# Patient Record
Sex: MALE
[De-identification: names, ages, dates, MRNs, and addresses within clinical notes are randomized per-mention and may not be internally consistent; named-entity substitution may affect disease eponyms.]

---

## 2022-05-18 ENCOUNTER — HOSPITAL ENCOUNTER (EMERGENCY)
Dept: HOSPITAL 97 - ER | Age: 47
LOS: 1 days | Discharge: HOME | End: 2022-05-19
Payer: SELF-PAY
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
## 2022-05-18 DIAGNOSIS — S42.302A: Primary | ICD-10-CM

## 2022-05-18 DIAGNOSIS — E78.00: ICD-10-CM

## 2022-05-18 DIAGNOSIS — I10: ICD-10-CM

## 2022-05-18 PROCEDURE — 99284 EMERGENCY DEPT VISIT MOD MDM: CPT

## 2022-05-18 PROCEDURE — 96372 THER/PROPH/DIAG INJ SC/IM: CPT

## 2022-05-18 NOTE — XMS REPORT
Continuity of Care Document

                             Created on:May 18, 2022



Patient:AURELIA ALVARADO

Sex:Male

:1975

External Reference #:[id number]





Demographics







                          Address                   [address] [place] [address]

 

                          Home Phone                [phone]

 

                          Work Phone                [phone]

 

                          Email Address             [email or web address]

 

                          Preferred Language        English

 

                          Marital Status            Unknown

 

                          Restorationism Affiliation     Unknown

 

                          Race                      Unknown

 

                          Ethnic Group              Unknown









Author







                          Organization              [facility]

t

 

                          Address                   [address]



                                                    [place] [address]

 

                          Phone                     [phone]









Support







                Name            Relationship    Address         Phone

 

                VINNIE HINSON Unavailable     [address] [facility] [phone]



                                                         



                                                [place] [address] 

 

                VINNIE HINSON Unavailable     [phone]

[address]



                                                         



                                                [place] [address] 

 

                NONE, OTHER     Unavailable     [address] [facility] [phone]



                                                         



                                                [place] [address] 









Care Team Providers







                    Name                Role                Phone

 

                    Unavailable         Unavailable         Unavailable









Payers







           Payer Name Policy Type Policy Number Effective Date Expiration Date S

ource







Problems

This patient has no known problems.



Allergies, Adverse Reactions, Alerts







       Allergy Allergy Status Severity Reaction(s) Onset  Inactive Treating Comm

ents 

Source



       Name   Type                        Date   Date   Clinician        

 

       [place] DA     Active U             -0                      [facility]



       ins                                3-15                        Clear



                                          00:00:                      [place]



                                          00                          [facility]

 

       [place] DA     Active U             -0                      [facility]



       ins                                3-13                        Clear



                                          00:00:                      [place]



                                          00                          [facility]

 

       [place] DA     Active U             -0                      [facility]



       ins                                4-16                        Clear



                                          00:00:                      [place]



                                          [address] [place] [address]







Medications

This patient has no known medications.



Procedures

This patient has no known procedures.



Results







           Test Description Test Time  Test Comments Results    Result     [facility]

e



                                                       Comments   

 

           SURGICAL SPECIMENS 2019            ---------------------       

     



                      07:52:00              ---------------------            



                                            ---------------------            



                                            ---------------------            



                                            --------RUN DATE:            



                                            19              



                                                   [facility]            



                                            LAB  *LIVE*            



                                                      PAGE 1            



                                            RUN TIME: 7846            



                                                                  



                                            Specimen Inquiry            



                                                          RUN            



                                            USER: INTERFACE            



                                                                  



                                                                  



                                                                  



                                            ---------------------            



                                            ---------------------            



                                            ---------------------            



                                            ---------------------            



                                            --------PATIENT:            



                                            AURELIA ALVARADO            



                                                    ACCT #:            



                                            [id number] [facility]            



                                            [facility] #:            



                                            [id number]            



                                                                  



                                                 AGE/SX: 43/M            



                                                ROOM: [facility]            



                                            RE19REG DR:            



                                            Bimal Londono MD            



                                                    :            



                                            73     BED:  1            



                                                    DIS: 19            



                                                                  



                                                                  



                                            STATUS: DIS Haylie            



                                            TLOC:                 



                                            ---------------------            



                                            ---------------------            



                                            ---------------------            



                                            ---------------------            



                                            -------- SPEC #:            



                                            19:CL:            



                                            RECD:             



                                              STATUS:  RAVEN RODRIGUEZ #: [id number]            



                                                                  



                                              VISHAL:             



                                                SUBM DR:            



                                            Bimal Londono MD            



                                                     ENTERED:            



                                                SP            



                                            TYPE: SURG SPEC            



                                            OTHR DR: Self            



                                            Referred              



                                                                  



                                                                  



                                                                  



                                            Jonathan Bailey MD, Svetang V MD Du, Khoi H MD Shah, Dhruvil R MDORDERED:  GM LEVEL            



                                            4                     



                                                                  



                                                                  



                                                    CODES: [id number]            



                                            - GALLBLADDER FOS            



                                            COPIES TO:   Self            



                                            Referred              



                                            Jonathan Bailey MD   [address]            



                                            [facility] [address]            



                                             [place] [address] [phone]            



                                            [email or web address]            



                                            [employer]            



                                            Annemarie Rebolledo MD            



                                            [address]            



                                            [place] [address] [phone]            



                                            Mark Meyer MD   [address] [place] [address] Blvd            



                                            [address]   [place]            



                                            [address] [phone]            



                                              Bimal Londono MD   [address]            



                                            [place]            



                                            [address]   [phone]            



                                              Parmjit Rdz MD            



                                             [address] [place] [address] [phone]            



                                            PROCEDURES: GM LEVEL            



                                            4 (Incomplete)            



                                            TISSUES:           1.            



                                            GALLBLADDER FOSSA OF            



                                            LIVER - Gallbladder            



                                                                  



                                                     ** CONTINUED            



                                            ON NEXT PAGE **            



                                            ---------------------            



                                            ---------------------            



                                            ---------------------            



                                            ---------------------            



                                            --------RUN DATE:            



                                            19              



                                                   [facility]            



                                            LAB  *LIVE*            



                                                      PAGE 2            



                                            RUN TIME: 753            



                                                                  



                                            Specimen Inquiry            



                                                          RUN            



                                            USER: INTERFACE            



                                                                  



                                                                  



                                                                  



                                            ---------------------            



                                            ---------------------            



                                            ---------------------            



                                            ---------------------            



                                            --------SPEC #:            



                                            19:CL:            



                                            PATIENT:              



                                            AURELIA ALVARADO            



                                                                  



                                            #[id number]            



                                            (Continued)----------            



                                            ---------------------            



                                            ---------------------            



                                            ---------------------            



                                            -------------------            



                                                   FINAL            



                                            DIAGNOSIS             



                                            Gallbladder,            



                                            cholecystectomy:            



                                            Acute and chronic            



                                            cholecystitis with            



                                            cholelithiasis.            



                                            GROSS AND MICROSCOPIC            



                                               GROSS DESCRIPTION:            



                                             Received in formalin            



                                            and labeled            



                                            "Gallbladder" is a 9            



                                            x   3 cm gallbladder.            



                                            The serosa is            



                                            erythematous with            



                                            focal exudate.  The            



                                            wall    thickness is            



                                            0.5 cm The mucosa is            



                                            erythematous with            



                                            focal necrosis, but            



                                            is    without            



                                            abnormal masses. The            



                                            lumen contains            



                                            multiple stones up to            



                                            1.3 cm in   diameter.            



                                             Representative            



                                            sections.             



                                                                  



                                                                  



                                                                  



                                                                  



                                                                  



                                            MICROSCOPIC            



                                            EXAMINATION:  The            



                                            gallbladder mucosa is            



                                            ulcerated with            



                                            mucosal    necrosis            



                                            and associated acute            



                                            inflammation, which            



                                            extends into the wall            



                                            and   onto the            



                                            serosa.               



                                                                  



                                                                  



                                                    POST-OP            



                                            DIAGNOSIS    Acute            



                                            cholecystitis            



                                             PRE-OP DIAGNOSIS            



                                            Acute cholecystitis            



                                                     REVIEWED BY:            



                                                                  



                                            CARLOS EDUARDO-------------------            



                                            ---------------------            



                                            ---------------------            



                                            ---------------------            



                                            ---------- Signed            



                                            SIGNATURE ON FILE            



                                                                  



                                            Vijay Carlson MD            



                                            19 0752            



                                            ---------------------            



                                            ---------------------            



                                            ---------------------            



                                            ---------------------            



                                            --------              



                                                                  



                                                            **            



                                            END OF REPORT **            









                    COMPREHENSIVE METABOLIC PANEL 2019 08:21:00 









                      Test Item  Value      Reference Range Interpretation Comme

nts









             SODIUM (test code = NA) 140 mEq/L    134-147      N            

 

             POTASSIUM (test code = K) 3.9 mEq/L    3.4-5.0      N            

 

             CHLORIDE (test code = CL) 108 mEq/L    100-108      N            

 

             CARBON DIOXIDE (test code = CO2) 27 mEq/L     21-33        N       

     

 

             ANION GAP (test code = GAP) 9            0-20         N            

 

             GLUCOSE (test code = GLU) 112 mg/dL           H            

 

             BLOOD UREA NITROGEN (test code = 12 mg/dL     7-18                 

     



             BUN)                                                

 

             GLOMERULAR FILTRATION RATE (test 105.0               N       

     Units of measure =



             code = GFR)                                         ml/min/1.73 m2

 

             CREATININE (test code = CREAT) 0.8 mg/dL    0.6-1.3      N         

   

 

             TOTAL PROTEIN (test code = PROT) 7.0 g/dL     6.4-8.2      N       

     

 

             ALBUMIN (test code = ALB) 3.30 g/dL    3.4-5.0      L            

 

             CALCIUM (test code = CA) 8.9 mg/dL    8.0-10.5     N            

 

             BILIRUBIN TOTAL (test code = 0.50 mg/dL   0.0-1.0                  

 



             BILT)                                               

 

             SGOT/AST (test code = AST) 132 IUnit/L  15-37        H            

 

             SGPT/ALT (test code = ALT) 302 IUnit/L  15-65        H            

 

             ALKALINE PHOSPHATASE TOTAL (test 111 IUnit/L         N       

     



             code = ALKP)                                        



CBC W/AUTO [id number] 06:57:00





             Test Item    Value        Reference Range Interpretation Comments

 

             WHITE BLOOD CELL (test code = 5.57 x10 3/uL 4.5-11.0     N         

   



             WBC)                                                

 

             RED BLOOD CELL (test code = 4.66 x10 6/uL 4.00-5.60    N           

 



             RBC)                                                

 

             HEMOGLOBIN (test code = HGB) 13.8 g/dL    12.5-16.9    N           

 

 

             HEMATOCRIT (test code = HCT) 42.5 %       37.5-50.7    N           

 

 

             MEAN CELL VOLUME (test code = 91.2 fL      81.0-99.0    N          

  



             MCV)                                                

 

             MEAN CELL HGB (test code = MCH) 29.6 pg      27.0-33.0    N        

    

 

             MEAN CELL HGB CONCETRATION 32.5 g/dL    33.0-37.0    L            



             (test code = MCHC)                                        

 

             RED CELL DISTRIBUTION WIDTH CV 11.9 %       11.5-14.5    N         

   



             (test code = RDW)                                        

 

             RED CELL DISTRIBUTION WIDTH SD 39.7 fL      37.0-54.0    N         

   



             (test code = RDW-SD)                                        

 

             PLATELET COUNT (test code = 272 x10 3/uL 150-400      N            



             PLT)                                                

 

             MEAN PLATELET VOLUME (test code 9.7 fL       7.0-9.0      H        

    



             = MPV)                                              

 

             NEUTROPHIL % (test code = NT%) 63.3 %       56.0-77.0    N         

   

 

             IMMATURE GRANULOCYTE % (test 0.4 %        0.0-2.0      N           

 



             code = IG%)                                         

 

             LYMPHOCYTE % (test code = LY%) 22.6 %       14.0-32.0    N         

   

 

             MONOCYTE % (test code = MO%) 11.3 %       4.8-9.0      H           

 

 

             EOSINOPHIL % (test code = EO%) 2.2 %        0.3-3.7      N         

   

 

             BASOPHIL % (test code = BA%) 0.2 %        0.0-2.0      N           

 

 

             NUCLEATED RBC % (test code = 0.0 %        0-0          N           

 



             NRBC%)                                              

 

             NEUTROPHIL # (test code = NT#) 3.53 x10 3/uL 2.0-7.6      N        

    

 

             IMMATURE GRANULOCYTE # (test 0.02 x10 3/uL 0.00-0.03    N          

  



             code = IG#)                                         

 

             LYMPHOCYTE # (test code = LY#) 1.26 x10 3/uL 1.0-3.8      N        

    

 

             MONOCYTE # (test code = MO#) 0.63 x10 3/uL 0.1-0.8      N          

  

 

             EOSINOPHIL # (test code = EO#) 0.12 x10 3/uL 0.0-0.2      N        

    

 

             BASOPHIL # (test code = BA#) 0.01 x10 3/uL 0.0-0.2      N          

  

 

             NUCLEATED RBC # (test code = 0.00 x10 3/uL 0.0-0.1      N          

  



             NRBC#)                                              

 

             MANUAL DIFF REQUIRED (test code NO                                 

    



             = MDIFF)                                            



PROTHROMBIN [id number] 05:27:00





             Test Item    Value        Reference Range Interpretation Comments

 

             PROTHROMBIN TIME 12.4 SECONDS 9.3-12.9     N            



             PATIENT (test code =                                        



             PTP)                                                

 

             INTERNATIONAL NORMAL 1.1          0.8-1.2      N                   

         TARGET



             RATIO (test code =                                        INR BY IN

DICATION



             INR)                                                Indication



                                                                 



                                                                   INR1. Prophyl

axis of



                                                                 venous thrombos

is



                                                                        2.0 - 3.

0



                                                                 (orthopedic keyla

adele),



                                                                 Prophylaxis of



                                                                 venous thrombos

is



                                                                 (other than hig

h-risk



                                                                  surgery), Nita

tment



                                                                 of Deep Vein



                                                                 Thrombosis/Pulm

onary



                                                                 Embolism, Preve

ntion



                                                                 of systemic emb

olism -



                                                                 Tissue heart va

lves,



                                                                 Acute Myocardia

l



                                                                 Infarction (to 

prevent



                                                                   systemic embo

lism),



                                                                 Valvular heart



                                                                 disease,   Atri

al



                                                                 Fibrillation,



                                                                 Bileaflet mecha

nical



                                                                 valve in aortic



                                                                 position.2. Mec

hanical



                                                                 prosthetic valv

es



                                                                 (high risk),   

 2.5 -



                                                                 3.5   Presence 

of



                                                                 Lupus Anticoagu

lant or



                                                                   Antiphospholi

pid



                                                                 Antibodies, Pre

vention



                                                                 of   systemic e

mbolism



                                                                 - Acute Myocard

ial



                                                                 Infarction   (t

o



                                                                 prevent recurre

nt



                                                                 infarct).



THROMBOPLASTIN TIME [id number] 05:27:00





             Test Item    Value        Reference Range Interpretation Comments

 

             THROMBOPLASTIN TIME 34.6 Seconds 25.0-39.5    N                Ther

apeutic



             PARTIAL (test code =                                        Range: 

61.8-83.8



             PTT)                                                Sec



                                                                 ****Effective



                                                                 2014****



- XR [id number] 16:45:00 FAX: Jonathan Montalvo [phone]    
[place]:   St: [facility] FAX:         Bimal Londono [phone]   
------------------------------------------------------------------------------- 
Name:   AURELIA ALVARADO                 [facility]                    :
1975  Age/S: 43/M           [address] [place] [address]         Unit #: 
[id number]      Loc: [address]       [place] [address]                 Phys: 
Bimal Londono MD                                                Acct: G
[id number] Dis Date:               Status: ADM IN                              
  PHONE #: [phone]     Exam Date:     2019     1559                  
FAX #: [phone]     Reason: ERCP                                           
EXAMS:                                               CPT CODE:      [id number] XR
ERCP                                    [id number]                    Patient: ERIBERTO ALVARADO.       : 1975;   Age: 43 years;   Gender: Male.       MR: 
[id number].       Ordering physician: Bimal Londono MD.               X-RAY 
ERCP.               HISTORY: Choledocholithiasis.               COMPARISON: MRCP
3/13/2019.               FINDINGS: Fluoroscopic assistance wasprovided during 
ERCP.  4 images       were submitted demonstrating wire cannulization and 
contrast    opacification of common bile, common hepatic, proximal intrahepatic 
     and cystic ducts.  Contrast also noted extending into gallbladder.        
Balloon sweep was performed.               Pleaserefer to intra-procedural 
report for further details.               Reference air kerma: 12.94 mGy        
   Fluoroscopic time of 64.5 seconds.               SL: [id number]          **
Electronically Signed by SHELLIE Duncan on 2019 at 9122 **                   
  Reported and signed by: Carlos Duncan M.D.               CC: Jonathan Bailey M.D.;
Bimal Londono MD                                                             
Technologist: RT Warner(R)                             [facility] 
Date/Time/By: 2019 (3894) : By: [email or web address] Orig Print D/T: S: 
2019 (8173)                         PAGE  1                       Signed 
ReportBASIC METABOLIC [id number] 08:07:00





             Test Item    Value        Reference Range Interpretation Comments

 

             SODIUM (test code = NA) 142 mEq/L    134-147      N            

 

             POTASSIUM (test code = 3.8 mEq/L    3.4-5.0      N            



             K)                                                  

 

             CHLORIDE (test code = 112 mEq/L    100-108      H            



             CL)                                                 

 

             CARBON DIOXIDE (test 27 mEq/L     21-33        N            



             code = CO2)                                         

 

             ANION GAP (test code = 7            0-20         N            



             GAP)                                                

 

             GLUCOSE (test code = 102 mg/dL           N            



             GLU)                                                

 

             BLOOD UREA NITROGEN 7 mg/dL      7-18         N            



             (test code = BUN)                                        

 

             GLOMERULAR FILTRATION 91.7                L            Units 

of measure =



             RATE (test code = GFR)                                        ml/mi

n/1.73 m2

 

             CREATININE (test code = 0.9 mg/dL    0.6-1.3      N            



             CREAT)                                              

 

             CALCIUM (test code = 7.9 mg/dL    8.0-10.5     L            



             CA)                                                 



CBC W/AUTO [id number] 07:46:00





             Test Item    Value        Reference Range Interpretation Comments

 

             WHITE BLOOD CELL (test code = 4.84 x10 3/uL 4.5-11.0     N         

   



             WBC)                                                

 

             RED BLOOD CELL (test code = 4.34 x10 6/uL 4.00-5.60    N           

 



             RBC)                                                

 

             HEMOGLOBIN (test code = HGB) 12.9 g/dL    12.5-16.9    N           

 

 

             HEMATOCRIT (test code = HCT) 39.9 %       37.5-50.7    N           

 

 

             MEAN CELL VOLUME (test code = 91.9 fL      81.0-99.0    N          

  



             MCV)                                                

 

             MEAN CELL HGB (test code = MCH) 29.7 pg      27.0-33.0    N        

    

 

             MEAN CELL HGB CONCETRATION 32.3 g/dL    33.0-37.0    L            



             (test code = MCHC)                                        

 

             RED CELL DISTRIBUTION WIDTH CV 12.0 %       11.5-14.5    N         

   



             (test code = RDW)                                        

 

             RED CELL DISTRIBUTION WIDTH SD 40.8 fL      37.0-54.0    N         

   



             (test code = RDW-SD)                                        

 

             PLATELET COUNT (test code = 268 x10 3/uL 150-400      N            



             PLT)                                                

 

             MEAN PLATELET VOLUME (test code 9.8 fL       7.0-9.0      H        

    



             = MPV)                                              

 

             NEUTROPHIL % (test code = NT%) 43.4 %       56.0-77.0    L         

   

 

             IMMATURE GRANULOCYTE % (test 0.2 %        0.0-2.0      N           

 



             code = IG%)                                         

 

             LYMPHOCYTE % (test code = LY%) 43.4 %       14.0-32.0    H         

   

 

             MONOCYTE % (test code = MO%) 8.5 %        4.8-9.0      N           

 

 

             EOSINOPHIL % (test code = EO%) 4.1 %        0.3-3.7      H         

   

 

             BASOPHIL % (test code = BA%) 0.4 %        0.0-2.0      N           

 

 

             NUCLEATED RBC % (test code = 0.0 %        0-0          N           

 



             NRBC%)                                              

 

             NEUTROPHIL # (test code = NT#) 2.10 x10 3/uL 2.0-7.6      N        

    

 

             IMMATURE GRANULOCYTE # (test 0.01 x10 3/uL 0.00-0.03    N          

  



             code = IG#)                                         

 

             LYMPHOCYTE # (test code = LY#) 2.10 x10 3/uL 1.0-3.8      N        

    

 

             MONOCYTE # (test code = MO#) 0.41 x10 3/uL 0.1-0.8      N          

  

 

             EOSINOPHIL # (test code = EO#) 0.20 x10 3/uL 0.0-0.2      N        

    

 

             BASOPHIL # (test code = BA#) 0.02 x10 3/uL 0.0-0.2      N          

  

 

             NUCLEATED RBC # (test code = 0.00 x10 3/uL 0.0-0.1      N          

  



             NRBC#)                                              

 

             MANUAL DIFF REQUIRED (test code NO                                 

    



             = MDIFF)                                            



CBC W/AUTO [id number] 13:42:00





             Test Item    Value        Reference Range Interpretation Comments

 

             WHITE BLOOD CELL (test code = 6.55 x10 3/uL 4.5-11.0     N         

   



             WBC)                                                

 

             RED BLOOD CELL (test code = 4.31 x10 6/uL 4.00-5.60    N           

 



             RBC)                                                

 

             HEMOGLOBIN (test code = HGB) 13.0 g/dL    12.5-16.9    N           

 

 

             HEMATOCRIT (test code = HCT) 39.1 %       37.5-50.7    N           

 

 

             MEAN CELL VOLUME (test code = 90.7 fL      81.0-99.0    N          

  



             MCV)                                                

 

             MEAN CELL HGB (test code = MCH) 30.2 pg      27.0-33.0    N        

    

 

             MEAN CELL HGB CONCETRATION 33.2 g/dL    33.0-37.0    N            



             (test code = MCHC)                                        

 

             RED CELL DISTRIBUTION WIDTH CV 11.9 %       11.5-14.5    N         

   



             (test code = RDW)                                        

 

             RED CELL DISTRIBUTION WIDTH SD 39.8 fL      37.0-54.0    N         

   



             (test code = RDW-SD)                                        

 

             PLATELET COUNT (test code = 258 x10 3/uL 150-400      N            



             PLT)                                                

 

             MEAN PLATELET VOLUME (test code 9.2 fL       7.0-9.0      H        

    



             = MPV)                                              

 

             NEUTROPHIL % (test code = NT%) 57.6 %       56.0-77.0    N         

   

 

             IMMATURE GRANULOCYTE % (test 0.2 %        0.0-2.0      N           

 



             code = IG%)                                         

 

             LYMPHOCYTE % (test code = LY%) 31.3 %       14.0-32.0    N         

   

 

             MONOCYTE % (test code = MO%) 7.9 %        4.8-9.0      N           

 

 

             EOSINOPHIL % (test code = EO%) 2.7 %        0.3-3.7      N         

   

 

             BASOPHIL % (test code = BA%) 0.3 %        0.0-2.0      N           

 

 

             NUCLEATED RBC % (test code = 0.0 %        0-0          N           

 



             NRBC%)                                              

 

             NEUTROPHIL # (test code = NT#) 3.77 x10 3/uL 2.0-7.6      N        

    

 

             IMMATURE GRANULOCYTE # (test 0.01 x10 3/uL 0.00-0.03    N          

  



             code = IG#)                                         

 

             LYMPHOCYTE # (test code = LY#) 2.05 x10 3/uL 1.0-3.8      N        

    

 

             MONOCYTE # (test code = MO#) 0.52 x10 3/uL 0.1-0.8      N          

  

 

             EOSINOPHIL # (test code = EO#) 0.18 x10 3/uL 0.0-0.2      N        

    

 

             BASOPHIL # (test code = BA#) 0.02 x10 3/uL 0.0-0.2      N          

  

 

             NUCLEATED RBC # (test code = 0.00 x10 3/uL 0.0-0.1      N          

  



             NRBC#)                                              

 

             MANUAL DIFF REQUIRED (test code NO                                 

    



             = MDIFF)                                            



HEPATIC FUNCTION [id number] 11:11:00





             Test Item    Value        Reference Range Interpretation Comments

 

             TOTAL PROTEIN (test code = PROT) 6.5 g/dL     6.4-8.2      N       

     

 

             ALBUMIN (test code = ALB) 3.30 g/dL    3.4-5.0      L            

 

             BILIRUBIN TOTAL (test code = BILT) 0.30 mg/dL   0.0-1.0      N     

       

 

             BILIRUBIN DIRECT (test code = 0.10 MG/DL   0.0-0.30     N          

  



             BILD)                                               

 

             BILIRUBIN INDIRECT (test code = 0.20 MG/DL                         

    



             BILIND)                                             

 

             SGOT/AST (test code = AST) 14 IUnit/L   15-37        L            

 

             SGPT/ALT (test code = ALT) 21 IUnit/L   15-65        N            

 

             ALKALINE PHOSPHATASE TOTAL (test 74 IUnit/L          N       

     



             code = ALKP)                                        



[id number] 11:11:00





             Test Item    Value        Reference Range Interpretation Comments

 

             LIPASE (test code = LIP) 80 IUnit/L          N            



PROTHROMBIN [id number] 10:59:00





             Test Item    Value        Reference Range Interpretation Comments

 

             PROTHROMBIN TIME 11.5 SECONDS 9.3-12.9     N            



             PATIENT (test code =                                        



             PTP)                                                

 

             INTERNATIONAL NORMAL 1.0          0.8-1.2      N                   

         TARGET



             RATIO (test code =                                        INR BY IN

DICATION



             INR)                                                Indication



                                                                 



                                                                   INR1. Prophyl

axis of



                                                                 venous thrombos

is



                                                                        2.0 - 3.

0



                                                                 (orthopedic keyla

adele),



                                                                 Prophylaxis of



                                                                 venous thrombos

is



                                                                 (other than hig

h-risk



                                                                  surgery), Nita

tment



                                                                 of Deep Vein



                                                                 Thrombosis/Pulm

onary



                                                                 Embolism, Preve

ntion



                                                                 of systemic emb

olism -



                                                                 Tissue heart va

lves,



                                                                 Acute Myocardia

l



                                                                 Infarction (to 

prevent



                                                                   systemic embo

lism),



                                                                 Valvular heart



                                                                 disease,   Atri

al



                                                                 Fibrillation,



                                                                 Bileaflet mecha

nical



                                                                 valve in aortic



                                                                 position.2. Mec

hanical



                                                                 prosthetic valv

es



                                                                 (high risk),   

 2.5 -



                                                                 3.5   Presence 

of



                                                                 Lupus Anticoagu

lant or



                                                                   Antiphospholi

pid



                                                                 Antibodies, Pre

vention



                                                                 of   systemic e

mbolism



                                                                 - Acute Myocard

ial



                                                                 Infarction   (t

o



                                                                 prevent recurre

nt



                                                                 infarct).



THROMBOPLASTIN TIME [id number] 10:59:00





             Test Item    Value        Reference Range Interpretation Comments

 

             THROMBOPLASTIN TIME 34.0 Seconds 25.0-39.5    N                Ther

apeutic



             PARTIAL (test code =                                        Range: 

61.8-83.8



             PTT)                                                Sec



                                                                 ****Effective



                                                                 2014****



- MRI ABDOMEN W/O [id number] 10:11:00 FAX: Jonathan Montalvo 
[phone]    [place]:   St: [facility] FAX: Madhu Amaya MD 
[phone]   
------------------------------------------------------------------------------- 
Name:   AURELIA ALVARADO                 [facility]                    :
1975  Age/S: 43/M           [address] [place] [address]         Unit #: 
[id number]      Loc: [place] [address]                 Phys: 
Madhu Hart MD                                                Acct: G
[id number] Dis Date:               Status: ADM IN                              
  PHONE #: [phone]     Exam Date:     2019     0850                  
FAX #: [phone]     Reason: RUQ pain, abnormal ultrasound                   
  EXAMS:                                               CPT CODE:      [id number] 
MRI ABDOMEN W/O CON                        [id number]                    PROCEDURE: 
MRI ABDOMEN WITHOUT CONTRAST (MRCP)               INDICATION: Right upper 
quadrant pain.  Abnormal ultrasound.        Cholelithiasis with dilated common 
bile duct.               COMPARISON: Ultrasound 3/13/2019, CT of 2018      
        TECHNIQUE: T2 weighted three-dimensional imaging of the biliary tree    
  was performed. Supplemental T2-weighted multiplanar sequences were       
obtained.        FINDINGS:        COMMON DUCT: Dilatation of the common duct 
with abrupt, relatively       blunt termination near the expected level of 
ampulla.  No discrete       filling defect.  Common hepatic duct 10 mm diameter.
 Common bile duct       8mm diameter..  Please note that sensitivity for ampull
sabino defects may       be limited by this modality.               INTRAHEPATIC 
BRANCHES: Mildly dilated without focal abnormality.               GALLBLADDER: 
Multiple low signal intensity filling defects within the       gallbladder.  
Pericholecystic edema.               PANCREATIC DUCT: Normal in course and 
caliber with dominant drainage       at the level of ampulla.               
ABDOMINAL VISCERA:Examination not designed for survey of the       abdominal 
viscera. The pancreas, spleen, adrenal glands maintain       normal T2 signal 
intensity.  6 mm circumscribed lesion of marked T2       hyperintensity segment 
8 right lobe liver incompletely characterized,       compatible with benign 
finding, cyst versus cavernous hemangioma.  Few       scattered subcentimeter 
foci of marked T2 hyperintensityin the renal       cortices incomplete 
characterized, compatible with renal cysts.  No       hydronephrosis or 
perinephric edema.               Additional comments: No free intraperitoneal 
fluid.  Metallic artifact       related to spinal fusion hardware.  Marrow 
signal intensity otherwise       maintained.                 IMPRESSION:        
 1.  Cholelithiasis.  Pericholecystic edema suspicious for        cholecystitis.
 Extrinsic etiologies remain in the differential.         2.  Biliary dilatation
with abrupt termination of the common bile duct         near the ampulla.  
Occult choledocholithiasis and stricture are     PAGE  1                       
Signed Report                    (CONTINUED) FAX: Jonathan Montalvo 
[phone]    [place]:   St: [facility] FAX: Madhu Amaya MD 
[phone]   
------------------------------------------------------------------------------- 
Name:   AURELIA ALVARADO                 [facility]                    :
1975  Age/S: 43/M           [address] [place] [address]         Unit #: 
[id number]      Loc: [place] [address]                 Phys: 
Madhu Hart MD                                                Acct: G
[id number] Dis Date:               Status: ADM IN                              
  PHONE #: [phone]     Exam Date:     2019     0850                  
FAX #: [phone]     Reason: RUQ pain, abnormal ultrasound                   
  EXAMS:                                               CPT CODE:      [id number] 
MRI ABDOMEN W/O CON                        [id number]               &lt;Continued&gt;
        included in the differential.  Please correlate with laboratory         
findings with further imaging as warranted.                             SL:  
[id number]                   ** Electronically Signed by SHELLIE Gonzalez 
**           **             on 2019 at 1011             **                
    Reported and signed by: Jonathan Gonzalez M.D.                               
CC: Jonathan Bailey M.D.; aMdhu Hart MD                                 
  Technologist: Cabrera Wheat, RT(R)(CT)(MR)    Trnscrd Date/Time/By: 2019 
(1011) : By: DilmaL           Orig Print D/T: S: 2019 (1014)           
             PAGE  2                       Signed Report- US ABDOMEN LTD
2019 03:03:00  Patient Name: AURELIA ALVARADO                 Unit No: 
[id number]          EXAMS:                              CPT CODE:       
[id number]  ABDOMEN LTD                             [id number]                    
HISTORY:   Male, 43 years of age with right upper quadrant pain Location code: 
R16               EXAM: ULTRASOUND OF THE RIGHT UPPER QUADRANT               
COMPARISON: Previous right upper quadrant ultrasounds and CT scans       
performed in 2018 and      TECHNIQUE: Real-time grayscale 2-D imaging, 
Doppler spectral analysis,       and Doppler color flow imaging were performed 
with the variable       megahertz curved array transducer transabdominally.     
         FINDINGS:        PANCREAS: Obscured by bowel gas.         GALLBLADDER: 
Multiple shadowing stones are present in the gallbladder       lumen. No 
gallbladder wall thickening or pericholecystic fluid.       Sonographer reports 
a negative Stanley's sign.       COMMON BILE DUCT: 7.2 mm, mildly dilated. No 
obvious intraluminal       filling defect.        LIVER: Normal in echogenicity.
No focal mass or enlargement. Portal       vein is patent with appropriate 
hepatopedal flow.       RIGHT KIDNEY: Unremarkable.        OTHER: There is no 
ascites.                 IMPRESSION:          1. Cholelithiasis.         2. CBD 
mildly dilated at 7.2 mm, new finding since prior study. If         there co
ncern for ductal obstruction, consider MRCP.         3. Pancreas is obscured by 
bowel gas.** Electronically Signed by Serena Montgomery MD on 2019 at 0303 ** 
                    Reported and signed by: Serena Montgomery MD                 CC:
Jonathan Bailey MD; Lauro Leblanc                                  
                                                     Technologist: Audrey Ortiz (RDMS AB OB)                          Transcrpt Date/Tm/Trnsp: 
2019 (0303) NadiraW                 Orig Print D/T: S: 2019 (0306)
                             [facility]                          NAME: 
AURELIA ALVARADO                 [id number] - [address]., [place]     [place] [address]                   : 1975 AGE: 43   SEX: M     
                                 ACCT NO: [id number] LOC: ANGELIA        PHONE 
#: [phone]               EXAM DATE: 2019 STATUS: REG ER     FAX #: 
[phone]               RADIOLOGY NO:            PAGE  1                     
 Signed ReportBASIC METABOLIC [id number] 02:30:00





             Test Item    Value        Reference Range Interpretation Comments

 

             SODIUM (test code = 140 MMOL/L   137-145      N            



             NA)                                                 

 

             POTASSIUM (test code = 3.9 MMOL/L   3.5-5.1      N            



             K)                                                  

 

             CHLORIDE (test code = 106 MMOL/L          N            



             CL)                                                 

 

             CARBON DIOXIDE (test 26 MMOL/L    22-30        N            



             code = CO2)                                         

 

             ANION GAP (test code = 12 MMOL/L    14-24        L            



             GAP)                                                

 

             GLUCOSE (test code = 109 MG/DL           H            



             GLU)                                                

 

             BLOOD UREA NITROGEN 15 MG/DL     9-20         N            



             (test code = BUN)                                        

 

             GLOMERULAR FILTRATION > 60                                   Report

ing units:



             RATE (test code = GFR)                                        ml/mi

n/1.73 m2



                                                                 (Modified MDRD



                                                                 Formula)Referen

ce



                                                                 Range: > or = 6

0



                                                                 ml/min/1.73 m2

 

             CREATININE (test code 0.90 MG/DL   0.66-1.25    N            



             = CREAT)                                            

 

             CALCIUM (test code = 9.5 MG/DL    8.4-10.2     N            



             CA)                                                 



HEPATIC FUNCTION [id number] 02:30:00





             Test Item    Value        Reference Range Interpretation Comments

 

             TOTAL PROTEIN (test code = PROT) 7.5 G/DL     6.2-7.6      N       

     

 

             ALBUMIN (test code = ALB) 4.2 G/DL     3.5-5.0      N            

 

             BILIRUBIN TOTAL (test code = < 0.1 MG/DL  0.2-1.3      L           

 



             BILT)                                               

 

             BILIRUBIN DIRECT (test code = 0.0 MG/DL    0.0-0.3      N          

  



             BILD)                                               

 

             SGOT/AST (test code = AST) 25 UNITS/L   17-59        N            

 

             SGPT/ALT (test code = ALT) 19 UNITS/L   21-72        L            

 

             ALKALINE PHOSPHATASE (test code = 92 UNITS/L          N      

      



             ALKP)                                               



[id number] 02:30:00





             Test Item    Value        Reference Range Interpretation Comments

 

             LIPASE (test code = LIP) 123 UNITS/L         N            



CBC W/AUTO [id number] 02:07:00





             Test Item    Value        Reference Range Interpretation Comments

 

             WHITE BLOOD CELL (test code = 7.4 K/MM3    3.8-9.8      N          

  



             WBC)                                                

 

             RED BLOOD CELL (test code = 4.75 M/MM3   3.95-5.67    N            



             RBC)                                                

 

             HEMOGLOBIN (test code = HGB) 13.9 G/DL    12.4-16.7    N           

 

 

             HEMATOCRIT (test code = HCT) 40.7 %       35.9-49.5    N           

 

 

             MEAN CELL VOLUME (test code = 86 fL        81.7-96.1    N          

  



             MCV)                                                

 

             MEAN CELL HGB (test code = MCH) 29.3 pg      27.6-33.2    N        

    

 

             MEAN CELL HGB CONCETRATION 34.2 %       32.9-35.5    N            



             (test code = MCHC)                                        

 

             RED CELL DISTRIBUTION WIDTH 11.9 %       12.1-15.2    L            



             (test code = RDW)                                        

 

             PLATELET COUNT (test code = 307 K/MM3    129-368      N            



             PLT)                                                

 

             MEAN PLATELET VOLUME (test code 9.3 fl       7.4-10.4     N        

    



             = MPV)                                              

 

             NEUTROPHIL % (test code = NT%) 53.9 %       43-75        N         

   

 

             IMMATURE GRANULOCYTE % (test 0.3 %        0.0-2.0      N           

 



             code = IG%)                                         

 

             LYMPHOCYTE % (test code = LY%) 34.1 %       14-44        N         

   

 

             MONOCYTE % (test code = MO%) 8.4 %        4-13         N           

 

 

             EOSINOPHIL % (test code = EO%) 2.9 %        0-6          N         

   

 

             BASOPHIL % (test code = BA%) 0.4 %        0-2          N           

 

 

             NUCLEATED RBC % (test code = 0.0 %        0-1.0        N           

 



             NRBC%)                                              

 

             NEUTROPHIL # (test code = NT#) 3.97 K/mm3   2.0-7.6      N         

   

 

             IMMATURE GRANULOCYTE # (test 0.02 x10 3/uL 0-0.03       N          

  



             code = IG#)                                         

 

             LYMPHOCYTE # (test code = LY#) 2.51 K/mm3   1.0-3.8      N         

   

 

             MONOCYTE # (test code = MO#) 0.62 K/mm3   0.1-0.8      N           

 

 

             EOSINOPHIL # (test code = EO#) 0.21 K/mm3   0.0-0.2      H         

   

 

             BASOPHIL # (test code = BA#) 0.03 K/mm3   0.0-0.2      N           

 

 

             NUCLEATED RBC # (test code = 0.00 K/mm3   0.0-0.1      N           

 



             NRBC#)

## 2022-05-19 VITALS — SYSTOLIC BLOOD PRESSURE: 136 MMHG | OXYGEN SATURATION: 96 % | DIASTOLIC BLOOD PRESSURE: 105 MMHG

## 2022-05-19 VITALS — TEMPERATURE: 97.7 F

## 2022-05-19 NOTE — RAD REPORT
EXAM DESCRIPTION:  RAD - Shoulder  Left 2 View - 5/18/2022 11:49 pm

 

CLINICAL HISTORY:  Injury, direct trauma left shoulder

 

COMPARISON:  None.

 

FINDINGS:  2 views of the left shoulder. Irregular contour and cortical step-off involving the superi
or lateral aspect of the humeral head. No definite dislocation. Normal osseous mineralization.   Carmela
ral change of the thoracolumbar spine partially visualized. No left rib fractures identified.

 

IMPRESSION:  1. Nondisplaced impaction fracture involving the superolateral aspect of the humeral hea
d. No definite dislocation.

 

Electronically signed by:   Joaquín Hinojosa   5/18/2022 11:59 PM CDT Workstation: [id number]

 

 

Due to temporary technical issues with the PACS/Fluency reporting system, reports are being signed by
 the in house radiologist without review as a courtesy to ensure prompt reporting. The interpreting r
adiologist is fully responsible for the content of the report.
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
## 2022-05-19 NOTE — RAD REPORT
EXAM DESCRIPTION:  RAD - Clavicle  Left - 5/18/2022 11:49 pm

 

CLINICAL HISTORY:  PAIN

 

COMPARISON:  None.

 

FINDINGS:  2 views of the left clavicle. Irregular contour and cortical step-off involving the superi
or lateral aspect of the humeral head. No definite dislocation. Normal osseous mineralization.   Carmela
ral change of the thoracolumbar spine partially visualized. No left rib fractures identified. No clav
icular fracture.

 

IMPRESSION:  1. Nondisplaced impaction fracture involving the superolateral aspect of the left humera
l head. No definite dislocation.

2. No clavicular fracture identified.

 

Electronically signed by:   Joaquín Hinojosa   5/18/2022 11:59 PM CDT Workstation: [id number]

 

 

Due to temporary technical issues with the PACS/Fluency reporting system, reports are being signed by
 the in house radiologist without review as a courtesy to ensure prompt reporting. The interpreting r
adiologist is fully responsible for the content of the report.

## 2022-05-19 NOTE — ER
Nurse's Notes                                                                                     

 [facility]                                                                 

Name: Jose Roberto Florentino                                                                            

Age: 46 yrs                                                                                       

Sex: Male                                                                                         

: 1975                                                                                   

MRN: [id number]                                                                                   

Arrival Date: 2022                                                                          

Time: 23:12                                                                                       

Account#: [id number]                                                                            

Bed 7                                                                                             

Private MD:                                                                                       

Diagnosis: Acute, closed, non-displaced, left humeral head impaction fracture                     

                                                                                                  

Presentation:                                                                                     

                                                                                             

23:22 Chief complaint: Patient states: I fell and landed on my shoulder. I think I broke my   jb4 

      arm or collar bone. Coronavirus screen: At this time, the client does not indicate any      

      symptoms associated with coronavirus-19. Ebola Screen: No symptoms or risks identified      

      at this time. Initial Sepsis Screen: Does the patient meet any 2 criteria? No.              

      Patient's initial sepsis screen is negative. Does the patient have a suspected source       

      of infection? No. Patient's initial sepsis screen is negative. Risk Assessment: Do you      

      want to hurt yourself or someone else? Patient reports no desire to harm self or            

      others. Onset of symptoms was May 18, 2022. Transition of care: patient was not             

      received from another setting of care.                                                      

23:22 Method Of Arrival: Ambulatory                                                           jb4 

23:22 Acuity: MARIANELA 3                                                                           jb4 

                                                                                                  

Historical:                                                                                       

- Allergies:                                                                                      

23:24 No Known Allergies;                                                                     jb4 

- PMHx:                                                                                           

23:24 high cholesterol; HTN;                                                                  jb4 

- PSHx:                                                                                           

23:24 Cholecystectomy;                                                                        jb4 

                                                                                                  

- Immunization history:: Adult Immunizations up to date.                                          

- Social history:: Smoking status: Patient denies any tobacco usage or history of.                

- Family history:: not pertinent.                                                                 

- Hospitalizations: : No recent hospitalization is reported.                                      

                                                                                                  

                                                                                                  

Screenin:30 Abuse screen: Denies threats or abuse. Nutritional screening: No deficits noted.        ll3 

      Tuberculosis screening: No symptoms or risk factors identified. Fall Risk No fall in        

      past 12 months (0 pts). No secondary diagnosis (0 pts). No IV (0 pts). Ambulatory Aid-      

      None/Bed Rest/Nurse Assist (0 pts). Gait- Normal/Bed Rest/Wheelchair (0 pts) Mental         

      Status- Oriented to own ability (0 pts). Total Godwin Fall Scale indicates No Risk (0-24     

      pts).                                                                                       

                                                                                                  

Assessment:                                                                                       

23:30 General: Appears uncomfortable, Behavior is calm, cooperative. Pain: Complains of pain  ll3 

      in left hand and left clavicle and left shoulder Pain currently is 10 out of 10 on a        

      pain scale. Pain began 4 hours ago. Is continuous, Noted to be guarding. Derm: Skin is      

      pink, warm \T\ dry. Musculoskeletal: Range of motion: limited in left hand and left         

      clavicle and left shoulder Reports pain in left hand and left clavicle and left             

      shoulder Falling after playing with children's toy called "one wheel".                      

                                                                                                  

Vital Signs:                                                                                      

23:22  / 94; Pulse 89; Resp 16; Temp 97.7(TE); Pulse Ox 100% on R/A; Pain 10/10;        jb4 

                                                                                             

00:11  / 105; Pulse 81; Resp 18 S; Pulse Ox 96% on R/A;                                 as6 

                                                                                                  

ED Course:                                                                                        

                                                                                             

23:12 Patient arrived in ED.                                                                  bp1 

23:13 Stephan Cooper MD is Attending Physician.                                                rn  

23:24 Triage completed.                                                                       jb4 

23:24 Arm band placed on right wrist.                                                         jb4 

23:30 Patient has correct armband on for positive identification. Bed in low position. Call   ll3 

      light in reach. Side rails up X 1.                                                          

23:50 XRAY Shoulder LEFT 2 view In Process Unspecified.                                       EDMS

23:50 XRAY Clavicle LEFT In Process Unspecified.                                              EDMS

                                                                                             

00:01 Arnel Monaco, RN is Primary Nurse.                                                    ll3 

00:09 Sven Pritchett MD is Referral Physician.                                              rn  

00:19 No provider procedures requiring assistance completed. Patient did not have IV access   as6 

      during this emergency room visit.                                                           

00:20 Sling applied to left arm.                                                              as6 

                                                                                                  

Administered Medications:                                                                         

                                                                                             

23:48 Drug: Ketorolac 30 mg Route: IM; Site: right deltoid;                                   ll3 

                                                                                             

00:20 Follow up: Response: No adverse reaction                                                as6 

                                                                                             

23:48 Drug: Norco (HYDROcodone-acetaminophen) 10 mg-325 mg 1 tabs Route: PO;                  ll3 

                                                                                             

00:20 Follow up: Response: No adverse reaction                                                as6 

                                                                                                  

                                                                                                  

Medication:                                                                                       

00:11 VIS not applicable for this client.                                                     as6 

                                                                                                  

Outcome:                                                                                          

00:10 Discharge ordered by MD.                                                                rn  

00:19 Discharged to home ambulatory.                                                          as6 

00:19 Condition: stable                                                                           

00:19 Discharge instructions given to patient, Instructed on discharge instructions, follow       

      up and referral plans. medication usage, Demonstrated understanding of instructions,        

      follow-up care, medications, Prescriptions given X 1.                                       

00:20 Patient left the ED.                                                                    as6 

                                                                                                  

Signatures:                                                                                       

Dispatcher [employer]                           EDMS                                                 

Stephan Cooper MD MD rn Bryson, James, RN RN   jb4                                                  

Latasha Guido Ashby, RN RN   as6                                                  

Arnel Monaco RN RN   ll3                                                  

                                                                                                  

Corrections: (The following items were deleted from the chart)                                    

                                                                                             

23:24 23:24 PSHx: None; [id number]                                                                   jb4 

                                                                                                  

**************************************************************************************************